# Patient Record
Sex: FEMALE | Race: AMERICAN INDIAN OR ALASKA NATIVE
[De-identification: names, ages, dates, MRNs, and addresses within clinical notes are randomized per-mention and may not be internally consistent; named-entity substitution may affect disease eponyms.]

---

## 2019-03-26 ENCOUNTER — HOSPITAL ENCOUNTER (EMERGENCY)
Dept: HOSPITAL 43 - DL.ED | Age: 69
Discharge: HOME | End: 2019-03-26
Payer: MEDICARE

## 2019-03-26 VITALS — SYSTOLIC BLOOD PRESSURE: 99 MMHG | DIASTOLIC BLOOD PRESSURE: 73 MMHG

## 2019-03-26 DIAGNOSIS — Z88.8: ICD-10-CM

## 2019-03-26 DIAGNOSIS — S62.102A: Primary | ICD-10-CM

## 2019-03-26 DIAGNOSIS — F17.210: ICD-10-CM

## 2019-03-26 DIAGNOSIS — S66.911A: ICD-10-CM
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
## 2019-03-26 DIAGNOSIS — Z88.5: ICD-10-CM

## 2019-03-26 DIAGNOSIS — Z79.899: ICD-10-CM

## 2019-03-26 DIAGNOSIS — I10: ICD-10-CM

## 2019-03-26 DIAGNOSIS — Z88.6: ICD-10-CM

## 2019-03-26 NOTE — EDM.PDOC
<Lyla Durand - Last Filed: 03/26/19 16:37>





ED HPI GENERAL MEDICAL PROBLEM





- General


Chief Complaint: Upper Extremity Injury/Pain


Stated Complaint: BROKEN WRIST?


Time Seen by Provider: 03/26/19 16:00


Source of Information: Reports: Patient





- History of Present Illness


INITIAL COMMENTS - FREE TEXT/NARRATIVE: 





Maxine is a 68 year old female presenting with bilateral wrist pain for 10 days. 

She states that she slipped and fell on the ice 10 days ago walking out of the 

house, fell backwards and used her hands to brace herself. She denies loss of 

consciousness, or any other injuries. She states that she had a DEXA scan done 

last week but otherwise has not been able to leave the house due to bad roads 

and poor weather.  


  ** Bilateral Wrist


Pain Score (Numeric/FACES): 6





- Related Data


 Allergies











Allergy/AdvReac Type Severity Reaction Status Date / Time


 


amitriptyline HCl Allergy  Abdominal Verified 02/24/15 07:36





[From Elavil]   Pain  


 


duloxetine HCl Allergy  Diarrhea Verified 02/24/15 07:36





[From Cymbalta]     


 


ibuprofen Allergy  Cannot Verified 04/21/15 12:51





   Remember  


 


indomethacin Allergy  Cannot Verified 04/02/15 13:34





   Remember  


 


lisinopril Allergy  Vomiting Verified 02/24/15 07:36


 


phenobarbital Allergy  Itching Verified 02/24/15 07:36


 


progesterone, micronized Allergy  Vomiting Verified 02/24/15 07:36





[From Endometrin]     











Home Meds: 


 Home Meds





Potassium Chloride 20 meq PO DAILY 02/23/15 [History]


Pregabalin [Lyrica] 50 mg PO BEDTIME 02/23/15 [History]


Acetaminophen [Tylenol] 650 mg PO Q6H 04/02/15 [History]


Cetirizine [ZyrTEC] 1 tab PO DAILY PRN 04/02/15 [History]


Cyclobenzaprine [Flexeril] 1 tab PO BEDTIME 04/02/15 [History]


Estrogens, Conjugated [Premarin] 1 tab PO .Q72HR 04/02/15 [History]


Potassium Chloride [Klor-Con] 1 tab PO DAILY 04/02/15 [History]


hydroCHLOROthiazide [Hydrochlorothiazide] 1 tab PO DAILY 04/02/15 [History]


traMADol HCl [Ultram] 50 mg PO BID 04/21/15 [History]


Losartan [Cozaar] 100 mg PO DAILY 03/26/19 [History]











Past Medical History


Cardiovascular History: Reports: Hypertension


Musculoskeletal History: Reports: Fibromyalgia





Social & Family History





- Tobacco Use


Smoking Status *Q: Current Every Day Smoker


Years of Tobacco use: 46


Packs/Tins Daily: 10





Review of Systems





- Review of Systems


Review Of Systems: ROS reveals no pertinent complaints other than HPI.





ED EXAM, GENERAL





- Physical Exam


Exam: See Below


Exam Limited By: No Limitations


General Appearance: Alert, Mild Distress


Eye Exam: Bilateral Eye: EOMI, Normal Inspection, PERRL


Head: Atraumatic, Normocephalic


Respiratory/Chest: No Respiratory Distress, Crackles, Wheezing


Cardiovascular: Regular Rate, Rhythm, No Murmur


Extremities: Normal Capillary Refill, Joint Swelling, Arm Pain (Bilateral wrist 

pain with flexion and extension. Bilateral erythema and edema. ), Limited Range 

of Motion (worse on left with limited wrist flexion, extension, radial and 

ulnar deviation. )


Neurological: Alert, Oriented, CN II-XII Intact, Normal Cognition


Skin Exam: Warm, Dry





ED TRAUMA EXTREMITY PROCEDURES





- Splinting


  ** Left Upper Extremity


Splint Site: Left wrist


Pre-Procedure NV Status: Normal


Post-Procedure NV Status: Normal


Splint Material: Fiberglass


Splint Design: Volar


Applied & Form Fitted By: Provider


Provider Post-Splint Application NV Check: NV Status Normal, Good Position


Complications: No





Course





- Vital Signs


Last Recorded V/S: 


 Last Vital Signs











Temp  37.3 C   03/26/19 15:30


 


Pulse  77   03/26/19 15:30


 


Resp  16   03/26/19 15:30


 


BP  99/73   03/26/19 15:30


 


Pulse Ox  99   03/26/19 15:30














- Orders/Labs/Meds


Orders: 


 Active Orders 24 hr











 Category Date Time Status


 


 DME for Discharge [COMM] Routine Oth  03/26/19 17:00 Ordered














Departure





- Departure


Disposition: Home, Self-Care 01


Clinical Impression: 


Left wrist fracture


Qualifiers:


 Encounter type: initial encounter Fracture type: closed Qualified Code(s): 

S62.102A - Fracture of unspecified carpal bone, left wrist, initial encounter 

for closed fracture





Strain of right wrist


Qualifiers:


 Encounter type: initial encounter Qualified Code(s): S66.911A - Strain of 

unspecified muscle, fascia and tendon at wrist and hand level, right hand, 

initial encounter








- Discharge Information


Instructions:  Wrist Splint, Adult, Easy-to-Read, Wrist Fracture Treated With 

Immobilization, Easy-to-Read


Forms:  ED Department Discharge


Care Plan Goals: 


The patient was advised of the examination and x-ray results during the visit. 

The patient's left wrist was placed in a fiberglass splint and a sling. The 

patient's right wrist was placed in a velcro splint for support. The patient 

was encouraged to follow-up with her primary care facility for a referral for 

an appointment with an orthopedic specialist. The patient may take Tylenol for 

temporary symptom relief. If the patient has any additional symptoms or concerns

, the patient should either return to the emergency department or visit her 

primary care facility. 





- My Orders


Last 24 Hours: 


My Active Orders





03/26/19 17:00


DME for Discharge [COMM] Routine 














- Assessment/Plan


Last 24 Hours: 


My Active Orders





03/26/19 17:00


DME for Discharge [COMM] Routine 














<Isidoro Velasco M - Last Filed: 03/26/19 17:09>





Course





- Re-Assessments/Exams


Free Text/Narrative Re-Assessment/Exam: 





03/26/19 17:00


I have examined the patient. I have discussed findings and treatment plan with 

the medical student. I agree with the assessment and plan in the following 

medical student's note.





Departure





- Departure


Time of Disposition: 17:05


Condition: Fair





- Discharge Information


*PRESCRIPTION DRUG MONITORING PROGRAM REVIEWED*: Not Applicable


*COPY OF PRESCRIPTION DRUG MONITORING REPORT IN PATIENT STELLA: Not Applicable

## 2019-03-26 NOTE — CR
Clinical history: 68-year-old female smoker injured left wrist in a fall (10

days ago). Now increased pain.

 

Interpretation: (3 views left; 3 views right wrist)

 

1. *Impacted distal diametaphyseal fracture left radius with associated mild

dorsal angulation distal fragment. 

    Tiny ulnar styloid avulsion.

    No sign of radiocarpal dislocation left wrist (chronic arthritic

degenerative changes carpal bones radial aspect wrist). 

    No metacarpal fractures left wrist.

 

2. Age/gender appropriate bone mineral density. No sign of right wrist fracture

or dislocation. No right metacarpal fractures.

## 2019-09-18 NOTE — EDM.PDOC
Scribed by Kelly Quintero 09/18/19 5904 for Shirin Hillman NP





ED HPI GENERAL MEDICAL PROBLEM





- General


Chief Complaint: Lower Extremity Injury/Pain


Stated Complaint: KNEE AND HIP BOTHERING HER


Time Seen by Provider: 09/18/19 17:01


Source of Information: Reports: Patient, RN, RN Notes Reviewed


History Limitations: Reports: No Limitations





- History of Present Illness


INITIAL COMMENTS - FREE TEXT/NARRATIVE: 


Patient presents to ER with complaint of pain to right knee and right hip. 

States she fell last week. She tripped in garden and fall on her right hip. 

States she had fallen in the garden 2 weeks prior to that. States pain with 

standing and ambulating. Denies using a cane or walker. Denies numbness or 

tingling. She has gotten cortisone shot in right knee after first fall. 





Patient also fell 4 weeks ago and tripped. She was seen at [facility] and had a 

cortisone shot. 


Onset: Gradual


Duration: Getting Worse


Location: Reports: Lower Extremity, Right


Quality: Reports: Ache


Severity: Mild


Improves with: Reports: None


Worsens with: Reports: None


Associated Symptoms: Reports: No Other Symptoms


  ** Right Knee


Pain Score (Numeric/FACES): 7





- Related Data


 Allergies











Allergy/AdvReac Type Severity Reaction Status Date / Time


 


amitriptyline HCl Allergy  Abdominal Verified 09/18/19 14:26





[From Elavil]   Pain  


 


duloxetine HCl Allergy  Diarrhea Verified 09/18/19 14:26





[From Cymbalta]     


 


ibuprofen Allergy  Cannot Verified 09/18/19 14:26





   Remember  


 


indomethacin Allergy  Cannot Verified 09/18/19 14:26





   Remember  


 


lisinopril Allergy  Vomiting Verified 09/18/19 14:26


 


phenobarbital Allergy  Itching Verified 09/18/19 14:26


 


progesterone, micronized Allergy  Vomiting Verified 09/18/19 14:26





[From Endometrin]     











Home Meds: 


 Home Meds





Pregabalin [Lyrica] 50 mg PO BEDTIME 02/23/15 [History]


Acetaminophen [Tylenol] 650 mg PO Q6H 04/02/15 [History]


Cetirizine [ZyrTEC] 1 tab PO DAILY PRN 04/02/15 [History]


Cyclobenzaprine [Flexeril] 1 tab PO BEDTIME 04/02/15 [History]


Estrogens, Conjugated [Premarin] 1 tab PO .Q72HR 04/02/15 [History]


Potassium Chloride [Klor-Con] 1 tab PO DAILY 04/02/15 [History]


hydroCHLOROthiazide [Hydrochlorothiazide] 1 tab PO DAILY 04/02/15 [History]


traMADol HCl [Ultram] 100 mg PO DAILY 04/21/15 [History]


Losartan [Cozaar] 100 mg PO DAILY 03/26/19 [History]


traMADol [Ultram] 50 mg PO BID 09/18/19 [History]











Past Medical History


HEENT History: Reports: Hard of Hearing, Impaired Vision


Other HEENT History: wears glasses, hole in the right eardrum


Cardiovascular History: Reports: Hypertension


Respiratory History: Reports: None


Genitourinary History: Reports: None


OB/GYN History: Reports: None


Musculoskeletal History: Reports: Fibromyalgia


Neurological History: Reports: None


Psychiatric History: Reports: None


Endocrine/Metabolic History: Reports: None


Hematologic History: Reports: None


Immunologic History: Reports: None


Oncologic (Cancer) History: Reports: None


Dermatologic History: Reports: None





- Infectious Disease History


Infectious Disease History: Reports: Chicken Pox, Measles, Mumps





- Past Surgical History


Head Surgeries/Procedures: Reports: None


GI Surgical History: Reports: Cholecystectomy


Other GI Surgeries/Procedures: spleen


Other Musculoskeletal Surgeries/Procedures:: shoulder





Social & Family History





- Tobacco Use


Smoking Status *Q: Current Every Day Smoker


Years of Tobacco use: 49


Packs/Tins Daily: 0.5


Second Hand Smoke Exposure: No





- Caffeine Use


Caffeine Use: Reports: Coffee, Tea





- Recreational Drug Use


Recreational Drug Use: No





Review of Systems





- Review of Systems


Review Of Systems: ROS reveals no pertinent complaints other than HPI.





ED EXAM, GENERAL





- Physical Exam


Exam: See Below


Exam Limited By: No Limitations


General Appearance: Alert, WD/WN, No Apparent Distress


Eye Exam: Bilateral Eye: EOMI, Normal Inspection


Ears: Normal External Exam, Hearing Grossly Normal


Nose: Normal Inspection


Throat/Mouth: Normal Inspection, Normal Voice, No Airway Compromise


Head: Atraumatic, Normocephalic


Neck: Normal Inspection, Supple, Non-Tender, Full Range of Motion


Respiratory/Chest: No Respiratory Distress, Lungs Clear, Normal Breath Sounds, 

No Accessory Muscle Use, Chest Non-Tender


Cardiovascular: Normal Peripheral Pulses, Regular Rate, Rhythm, No Edema, No 

Gallop, No JVD, No Murmur, No Rub


Peripheral Pulses: 2+: Radial (L), Radial (R)


GI/Abdominal: Normal Bowel Sounds, Soft, Non-Tender


 (Female) Exam: Deferred


Rectal (Female) Exam: Deferred


Back Exam: Normal Inspection, Decreased Range of Motion


Extremities: Leg Pain (right knee, hip), Limited Range of Motion (right knee, 

right hip).  No: Joint Swelling


Neurological: Alert, Oriented, CN II-XII Intact, Normal Cognition, Normal 

Reflexes, No Motor/Sensory Deficits, Other (limp on right leg)


Psychiatric: Normal Affect, Normal Mood


Skin Exam: Warm, Dry, Intact, Normal Color, No Rash


Lymphatic: No Adenopathy





Course





- Vital Signs


Last Recorded V/S: 


 Last Vital Signs











Temp  98.5 F   09/18/19 14:21


 


Pulse  98   09/18/19 14:21


 


Resp  16   09/18/19 14:21


 


BP  136/75   09/18/19 14:21


 


Pulse Ox  99   09/18/19 14:21














- Orders/Labs/Meds


Orders: 


 Active Orders 24 hr











 Category Date Time Status


 


 Hip Min 2V or 3V w Pelvis Rt [CR] Stat Exams  09/18/19 17:10 Ordered


 


 Knee 3V Rt [CR] Urgent Exams  09/18/19 17:10 Ordered














- Radiology Interpretation


Free Text/Narrative:: 


Right knee xray:


FINDINGS:


Bones/joints: There are pronounced degenerative changes of the patellofemoral 

joint. There is no


evidence of acute fracture. There is no evidence of a joint effusion.


Soft tissues: No soft tissue swelling is identified.


IMPRESSION:


1. Marked degenerative joint disease at the patellofemoral joint.


2. No acute abnormality.


Thank you for allowing us to participate in the care of your patient.


Dictated and Authenticated by: Wilson, Duane, MD


09/18/2019 5:39 PM Central Time ([place] & [place])





Right hip/pelvis xray:


FINDINGS:


Bones/joints: The alignment of the joints is anatomic and the joint spaces are 

maintained. There is


no evidence of acute fracture.


Soft tissues: No soft tissue swelling is identified. Calcified injection 

granuloma is noted noted in the


subcutaneous tissues of the buttocks.


IMPRESSION:


No acute abnormality.


Thank you for allowing us to participate in the care of your patient.


Dictated and Authenticated by: Wilson, Duane, MD


09/18/2019 5:40 PM Central Time ([place] & [place])





See rad report








Departure





- Departure


Time of Disposition: 17:43


Disposition: Home, Self-Care 01


Condition: Fair


Clinical Impression: 


 Hip pain, right





Right knee pain


Qualifiers:


 Chronicity: chronic Qualified Code(s): M25.561 - Pain in right knee; G89.29 - 

Other chronic pain








- Discharge Information


*PRESCRIPTION DRUG MONITORING PROGRAM REVIEWED*: No


*COPY OF PRESCRIPTION DRUG MONITORING REPORT IN PATIENT STELLA: No


Instructions:  Knee Sprain, Adult, Easy-to-Read, Muscle Strain, Easy-to-Read, 

Joint Pain, Easy-to-Read


Referrals: 


PCP,Unobtain [Primary Care Provider] - 


Forms:  ED Department Discharge


Additional Instructions: 


Heat areas as tolerated


May use Tylenol as directed for pain


Rest


Use cane or walker to prevent falls


Follow up with your primary care facility for referral to orthopedics








- My Orders


Last 24 Hours: 


My Active Orders





09/18/19 17:10


Hip Min 2V or 3V w Pelvis Rt [CR] Stat 


Knee 3V Rt [CR] Urgent 














- Assessment/Plan


Last 24 Hours: 


My Active Orders





09/18/19 17:10


Hip Min 2V or 3V w Pelvis Rt [CR] Stat 


Knee 3V Rt [CR] Urgent 














I have read and agree with the documentation that has been completed regarding 

this visit. By signing this record, I attest that the documentation was 

completed in my physical presence and is an accurate record of the encounter.